# Patient Record
Sex: MALE | URBAN - METROPOLITAN AREA
[De-identification: names, ages, dates, MRNs, and addresses within clinical notes are randomized per-mention and may not be internally consistent; named-entity substitution may affect disease eponyms.]

---

## 2020-04-20 PROBLEM — Z00.00 ENCOUNTER FOR PREVENTIVE HEALTH EXAMINATION: Status: ACTIVE | Noted: 2020-04-20

## 2020-10-31 ENCOUNTER — EMERGENCY (EMERGENCY)
Facility: HOSPITAL | Age: 25
LOS: 1 days | Discharge: HOME | End: 2020-10-31
Payer: COMMERCIAL

## 2020-10-31 VITALS
WEIGHT: 149.91 LBS | TEMPERATURE: 98 F | SYSTOLIC BLOOD PRESSURE: 136 MMHG | DIASTOLIC BLOOD PRESSURE: 89 MMHG | RESPIRATION RATE: 18 BRPM | OXYGEN SATURATION: 97 % | HEART RATE: 92 BPM | HEIGHT: 71 IN

## 2020-10-31 VITALS
DIASTOLIC BLOOD PRESSURE: 85 MMHG | RESPIRATION RATE: 18 BRPM | OXYGEN SATURATION: 99 % | HEART RATE: 72 BPM | SYSTOLIC BLOOD PRESSURE: 126 MMHG

## 2020-10-31 PROCEDURE — 99284 EMERGENCY DEPT VISIT MOD MDM: CPT

## 2020-10-31 RX ORDER — ERYTHROMYCIN BASE 5 MG/GRAM
1 OINTMENT (GRAM) OPHTHALMIC (EYE) ONCE
Refills: 0 | Status: DISCONTINUED | OUTPATIENT
Start: 2020-10-31 | End: 2020-11-04

## 2020-10-31 RX ORDER — OFLOXACIN 0.3 %
1 DROPS OPHTHALMIC (EYE) ONCE
Refills: 0 | Status: DISCONTINUED | OUTPATIENT
Start: 2020-10-31 | End: 2020-11-04

## 2020-10-31 NOTE — CONSULT NOTE ADULT - SUBJECTIVE AND OBJECTIVE BOX
[facility] DEPARTMENT OF OPHTHALMOLOGY - INITIAL ADULT CONSULT  -----------------------------------------------------------------------------  Duc Garcia MD PGY-2  -----------------------------------------------------------------------------    HPI: 24 YO M presents to ED c/o foreign body in right eye with pain consulted by ED for removal of rust ring. Pt states he works as a  and was cutting sheet metal while wearing eye protection. PT stated he felt pain and light sensitivity in the right eye after work and went to the ED. Pt endorsed right eye pain, light sensitivity. Pt denied flashes and floaters. When pt was seen, the ED team removed the foreign body with a needle and isiah drill. Consulted for removal of rust ring. Pts right eye pain improved with instillation of proparacaine.     PMH: none  POcHx: denies surg/laser  FH: denies glc/amd  Social History: marijuana  Ophthalmic Medications: none  Allergies: NKDA    Review of Systems:  Constitutional: No fever, chills  Eyes: No blurry vision, flashes, floaters, FBS, erythema, discharge, double vision, OU  Neuro: No tremors  Cardiovascular: No chest pain, palpitations  Respiratory: No SOB, no cough  GI: No nausea, vomiting, abdominal pain  : No dysuria  Skin: no rash  Psych: no depression  Endocrine: no polyuria, polydipsia  Heme/lymph: no swelling    VITALS: T(C): 36.7 (10-31-20 @ 18:48)  T(F): 98.1 (10-31-20 @ 18:48), Max: 98.1 (10-31-20 @ 18:48)  HR: 72 (10-31-20 @ 22:39) (72 - 92)  BP: 126/85 (10-31-20 @ 22:39) (126/85 - 136/89)  RR:  (18 - 18)  SpO2:  (97% - 99%)  Wt(kg): --  General: AAO x 3, appropriate mood and affect    Ophthalmology Exam:  Visual acuity (sc): 20/20 OU  Pupils: PERRL OU, no APD  Ttono: 14 and 12 OU  Extraocular movements (EOMs): Full OU, no pain, no diplopia  Confrontational Visual Field (CVF): Full OU  Color Plates: 12/12 OU    Pen Light Exam (PLE)  External: Flat OU  Lids/Lashes/Lacrimal Ducts: Flat OU. Lids flipped, everted and swept, no foreign bodies.    Sclera/Conjunctiva: W+Q OU  Cornea: 3.5x2mm corneal abrasion right eye with <1mm residual rust ring at 6 o clock position removed with 27g needle. Cl OS  Anterior Chamber: D+F OU    Iris: Flat OU  Lens: Cl OU    Fundus Exam: dilated with 1% tropicamide and 2.5% phenylephrine  Approval obtained from primary team for dilation  Patient aware that pupils can remained dilated for at least 4-6 hours  Exam performed with 20D lens    Vitreous: wnl OU  Disc, cup/disc: sharp and pink, 0.2 OU  Macula: wnl OU  Vessels: wnl OU  Periphery: wnl OU

## 2020-10-31 NOTE — ED PROVIDER NOTE - NS_ ATTENDINGSCRIBEDETAILS _ED_A_ED_FT
MD Huggins:  The scribe's documentation has been prepared under my direction and personally reviewed by me in its entirety. I confirm that the note above accurately reflects all work, treatment, procedures, and medical decision making performed by me.  MD Huggins:  see the MDM & progress notes for my I&P.

## 2020-10-31 NOTE — ED PROVIDER NOTE - PHYSICAL EXAMINATION
Benedicto Huggins (MD):  Gen:  Well appearing in NAD  Head:  NC/AT  Eye: EOMI, intraocular pressure 21 in right and 19 in left, rust ring at 6 o'clock position centrally underneath pupil  Resp: No distress   Ext: no deformities  Skin: warm and dry as visualized Benedicto Huggins (MD):  Gen:  Well appearing in NAD  Head:  NC/AT  Eye: EOMI, intraocular pressure 21 in right and 19 in left, rust ring at 6 o'clock position centrally underneath pupil.  size of rust ring ~ 2mm.  Acuity OS 20/20; OD 20/50  Resp: No distress   Ext: no deformities  Skin: warm and dry as visualized

## 2020-10-31 NOTE — ED PROVIDER NOTE - NSFOLLOWUPINSTRUCTIONS_ED_ALL_ED_FT
Follow up with ophthalmology clinic Monday 9:30am, [address], [address]  Erythromycin ointment BID (2x per day) Corneal Abrasion & Foreign body to the cornea with rust ring    The cornea is the clear covering at the front and center of the eye. This very thin tissue is made up of many layers. If a scratch or injury causes the corneal epithelium to come off, it is called a corneal abrasion. Symptoms include eye pain, redness, tearing, difficulty keeping eye open, and light sensitivity. Do not drive or operate machinery if your eye is patched.  Antibiotic eye drops may be prescribed to reduce the risk of infection.  It is important to follow up with an ophthalmologist (eye doctor) to ensure proper healing.    Prevent another eye foreign body  •Wear safety glasses, eye shields, or goggles. These items can prevent eye injury. Make sure the eyewear wraps around the sides of your face. Wear these items while you work with chemicals, metal, wood, or bodily fluids such as blood. Also wear protective eyewear during sports such as racquetball or swimming. Do not use regular eye glasses for eye protection. They will not protect your eyes from foreign bodies or chemicals.      SEEK IMMEDIATE MEDICAL CARE IF YOU HAVE ANY OF THE FOLLOWING SYMPTOMS: discharge from eyes, changes in vision, fever, or swelling.    Follow up with ophthalmology clinic Monday 9:30am, [address] [facility], [address]  Erythromycin ointment BID (2x per day) Corneal Abrasion & Foreign body to the cornea with rust ring    The cornea is the clear covering at the front and center of the eye. This very thin tissue is made up of many layers. If a scratch or injury causes the corneal epithelium to come off, it is called a corneal abrasion. Symptoms include eye pain, redness, tearing, difficulty keeping eye open, and light sensitivity. Do not drive or operate machinery if your eye is patched.  Antibiotic eye drops may be prescribed to reduce the risk of infection.  It is important to follow up with an ophthalmologist (eye doctor) to ensure proper healing.    Prevent another eye foreign body  •Wear safety glasses, eye shields, or goggles. These items can prevent eye injury. Make sure the eyewear wraps around the sides of your face. Wear these items while you work with chemicals, metal, wood, or bodily fluids such as blood. Also wear protective eyewear during sports such as racquetball or swimming. Do not use regular eye glasses for eye protection. They will not protect your eyes from foreign bodies or chemicals.    Follow up with ophthalmology clinic Monday 9:30am, [address] [facility] [facility], [address]  Erythromycin ointment at night (1cm ribbon to inner eyelid)  Ofloxacin 2 drops once/day    SEEK IMMEDIATE MEDICAL CARE IF YOU HAVE ANY OF THE FOLLOWING SYMPTOMS: discharge from eyes, changes in vision, fever, or swelling. Corneal Abrasion & Foreign body to the cornea with rust ring    The cornea is the clear covering at the front and center of the eye. This very thin tissue is made up of many layers. If a scratch or injury causes the corneal epithelium to come off, it is called a corneal abrasion. Symptoms include eye pain, redness, tearing, difficulty keeping eye open, and light sensitivity. Do not drive or operate machinery if your eye is patched.  Antibiotic eye drops may be prescribed to reduce the risk of infection.  It is important to follow up with an ophthalmologist (eye doctor) to ensure proper healing.    Prevent another eye foreign body  •Wear safety glasses, eye shields, or goggles. These items can prevent eye injury. Make sure the eyewear wraps around the sides of your face. Wear these items while you work with chemicals, metal, wood, or bodily fluids such as blood. Also wear protective eyewear during sports such as racquetball or swimming. Do not use regular eye glasses for eye protection. They will not protect your eyes from foreign bodies or chemicals.    Follow up with ophthalmology clinic Monday 9:30am, [address] [facility], [address]  Erythromycin ointment at night (1cm ribbon to inner eyelid)  Ofloxacin 2 drops four times a day    SEEK IMMEDIATE MEDICAL CARE IF YOU HAVE ANY OF THE FOLLOWING SYMPTOMS: discharge from eyes, changes in vision, fever, or swelling.

## 2020-10-31 NOTE — ED ADULT NURSE REASSESSMENT NOTE - NS ED NURSE REASSESS COMMENT FT1
Pt states some relief with right eye discomfort. Pt states he will not be driving and a friend will be picking him up from ED. Eyedrops and erythromycin sent home with patient and instructed how to use by RN.

## 2020-10-31 NOTE — ED PROVIDER NOTE - OBJECTIVE STATEMENT
25y M presents to ED c/o foreign body in right eye with pain. He works as a  and around 4pm today, he was cutting sheet metal. He went home, took a shower and immediately experienced burning pain to both eyes. He then noticed something in his right eye and came to the ED.

## 2020-10-31 NOTE — ED PROVIDER NOTE - PATIENT PORTAL LINK FT
You can access the FollowMyHealth Patient Portal offered by [facility] by registering at the following website: [email or web address]. By joining [facility]’s FollowMyHealth portal, you will also be able to view your health information using other applications (apps) compatible with our system.

## 2020-10-31 NOTE — ED ADULT NURSE NOTE - OBJECTIVE STATEMENT
Pt is a AAox3, 25y male c/o of getting metal in his eye. 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 Pt b/l eyes reddened and watery. States 10/10 pain in right eye as well as blurry vision. Visible particle located on right cornea upon assessment. No s/s bleeding or obvious deformity noted. Denies cp, sob, n/v, diarrhea or ha. Pt is a AAox3, 25y male c/o of getting metal in his eye. 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 Pt b/l eyes reddened and watery. States 10/10 pain in right eye as well as blurry vision. Visible particle located on right iris upon assessment. No s/s bleeding or obvious deformity noted. Denies cp, sob, n/v, diarrhea or ha. Pt is a AAox3, 25y male c/o of getting metal in his eye. 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 Pt b/l eyes reddened and watery. States 10/10 pain in right eye as well as blurry vision. Visible particle located on lower right iris upon assessment. No s/s bleeding or obvious deformity noted. Denies cp, sob, n/v, diarrhea or ha.

## 2020-10-31 NOTE — ED PROVIDER NOTE - CLINICAL SUMMARY MEDICAL DECISION MAKING FREE TEXT BOX
Benedicto Huggins (MD): 25y M w/ corneal foreign body, corneal rust ring. Benedicto Huggins (MD): 25y M w/ corneal foreign body, corneal rust ring, corneal abrasion

## 2020-10-31 NOTE — ED PROVIDER NOTE - ATTENDING CONTRIBUTION TO CARE
MD Huggins:  patient seen and evaluated with the resident.  I was present for key portions of the History & Physical, and I agree with the Impression & Plan.  MD Huggins:

## 2020-10-31 NOTE — ED PROVIDER NOTE - NSFOLLOWUPCLINICS_GEN_ALL_ED_FT
[facility] - Ophthalmology  Ophthalmology  [address], [facility] [address]  [place] [address]  Phone: [phone]  Fax:   Follow Up Time: 1-3 Days

## 2020-10-31 NOTE — ED PROVIDER NOTE - CARE PLAN
Principal Discharge DX:	Abrasion of right cornea, initial encounter  Secondary Diagnosis:	Foreign body of right eye, initial encounter

## 2020-10-31 NOTE — ED PROVIDER NOTE - PROGRESS NOTE DETAILS
Benedicto Huggins (MD): Unable to remove rust ring with 18G needle. Will discuss with optho. Lids everted, no evidence of retained foreign body. Benedicto Huggins (MD): Unable to remove rust ring with 18G needle. Will discuss with optho. Lids everted, no evidence of retained foreign body.  Unable to completely remove rust ring.

## 2020-10-31 NOTE — CONSULT NOTE ADULT - ASSESSMENT
Assessment and Recommendations:  25y male w/ no pmhx consulted for removal of rust ring in right eye. VA 20/20 ou, IOP WNL OU, no APD, with 3.5x2mm corneal abrasion and <1mm residual rust ring found in right cornea.    #Rust ring right eye  - Metallic foreign body removed by ED team prior to consult  - rust ing removed with 27g needle   - ofloxacin 1 drop QID right eye  - erythromycin ointment qhs right eye  - oral acetaminophen or NSAIDs PRN for pain control    Outpatient follow-up: Patient should follow-up with his/her ophthalmologist or with [facility] Department of Ophthalmology within 2-3 days after discharge at:    [address]  [place] [address]  [phone]    Duc Garcia MD, PGY-2

## 2020-11-02 ENCOUNTER — APPOINTMENT (OUTPATIENT)
Dept: OPHTHALMOLOGY | Facility: CLINIC | Age: 25
End: 2020-11-02

## 2020-11-05 ENCOUNTER — APPOINTMENT (OUTPATIENT)
Dept: OPHTHALMOLOGY | Facility: CLINIC | Age: 25
End: 2020-11-05

## 2020-11-09 ENCOUNTER — APPOINTMENT (OUTPATIENT)
Dept: OPHTHALMOLOGY | Facility: CLINIC | Age: 25
End: 2020-11-09

## 2020-11-19 PROBLEM — Z00.00 ENCOUNTER FOR PREVENTIVE HEALTH EXAMINATION: Noted: 2020-11-19

## 2023-03-14 NOTE — ED ADULT NURSE NOTE - NSIMPLEMENTINTERV_GEN_ALL_ED
30 Implemented All Universal Safety Interventions:  [place] to call system. Call bell, personal items and telephone within reach. Instruct patient to call for assistance. Room bathroom lighting operational. Non-slip footwear when patient is off stretcher. Physically safe environment: no spills, clutter or unnecessary equipment. Stretcher in lowest position, wheels locked, appropriate side rails in place.